# Patient Record
Sex: MALE | NOT HISPANIC OR LATINO | Employment: FULL TIME | URBAN - METROPOLITAN AREA
[De-identification: names, ages, dates, MRNs, and addresses within clinical notes are randomized per-mention and may not be internally consistent; named-entity substitution may affect disease eponyms.]

---

## 2017-06-27 ENCOUNTER — APPOINTMENT (OUTPATIENT)
Dept: RADIOLOGY | Facility: CLINIC | Age: 47
End: 2017-06-27
Payer: OTHER MISCELLANEOUS

## 2017-06-27 ENCOUNTER — TRANSCRIBE ORDERS (OUTPATIENT)
Dept: RADIOLOGY | Facility: CLINIC | Age: 47
End: 2017-06-27

## 2017-06-27 ENCOUNTER — APPOINTMENT (OUTPATIENT)
Dept: OCCUPATIONAL MEDICINE | Facility: CLINIC | Age: 47
End: 2017-06-27
Payer: OTHER MISCELLANEOUS

## 2017-06-27 DIAGNOSIS — S67.22XA: ICD-10-CM

## 2017-06-27 DIAGNOSIS — S67.22XA: Primary | ICD-10-CM

## 2017-06-27 PROCEDURE — 99203 OFFICE O/P NEW LOW 30 MIN: CPT

## 2017-06-27 PROCEDURE — 73130 X-RAY EXAM OF HAND: CPT

## 2017-06-30 ENCOUNTER — APPOINTMENT (OUTPATIENT)
Dept: OCCUPATIONAL MEDICINE | Facility: CLINIC | Age: 47
End: 2017-06-30
Payer: OTHER MISCELLANEOUS

## 2017-06-30 PROCEDURE — 99213 OFFICE O/P EST LOW 20 MIN: CPT

## 2017-07-19 ENCOUNTER — APPOINTMENT (OUTPATIENT)
Dept: OCCUPATIONAL MEDICINE | Facility: CLINIC | Age: 47
End: 2017-07-19
Payer: OTHER MISCELLANEOUS

## 2017-07-19 PROCEDURE — 99213 OFFICE O/P EST LOW 20 MIN: CPT

## 2017-07-31 ENCOUNTER — APPOINTMENT (OUTPATIENT)
Dept: URGENT CARE | Facility: CLINIC | Age: 47
End: 2017-07-31
Payer: OTHER MISCELLANEOUS

## 2017-07-31 PROCEDURE — 99214 OFFICE O/P EST MOD 30 MIN: CPT

## 2019-06-03 ENCOUNTER — TRANSCRIBE ORDERS (OUTPATIENT)
Dept: ADMINISTRATIVE | Facility: HOSPITAL | Age: 49
End: 2019-06-03

## 2019-06-03 DIAGNOSIS — M25.561 RIGHT KNEE PAIN, UNSPECIFIED CHRONICITY: Primary | ICD-10-CM

## 2025-07-24 ENCOUNTER — OFFICE VISIT (OUTPATIENT)
Age: 55
End: 2025-07-24
Payer: COMMERCIAL

## 2025-07-24 VITALS — BODY MASS INDEX: 38.92 KG/M2 | HEIGHT: 71 IN | WEIGHT: 278 LBS

## 2025-07-24 DIAGNOSIS — M77.11 LATERAL EPICONDYLITIS OF RIGHT ELBOW: Primary | ICD-10-CM

## 2025-07-24 PROCEDURE — 99213 OFFICE O/P EST LOW 20 MIN: CPT | Performed by: FAMILY MEDICINE

## 2025-07-24 PROCEDURE — 20605 DRAIN/INJ JOINT/BURSA W/O US: CPT | Performed by: FAMILY MEDICINE

## 2025-07-24 RX ORDER — TRIAMCINOLONE ACETONIDE 40 MG/ML
40 INJECTION, SUSPENSION INTRA-ARTICULAR; INTRAMUSCULAR
Status: COMPLETED | OUTPATIENT
Start: 2025-07-24 | End: 2025-07-24

## 2025-07-24 RX ORDER — LIDOCAINE HYDROCHLORIDE 10 MG/ML
1 INJECTION, SOLUTION INFILTRATION; PERINEURAL
Status: COMPLETED | OUTPATIENT
Start: 2025-07-24 | End: 2025-07-24

## 2025-07-24 RX ADMIN — TRIAMCINOLONE ACETONIDE 40 MG: 40 INJECTION, SUSPENSION INTRA-ARTICULAR; INTRAMUSCULAR at 10:00

## 2025-07-24 RX ADMIN — LIDOCAINE HYDROCHLORIDE 1 ML: 10 INJECTION, SOLUTION INFILTRATION; PERINEURAL at 10:00

## 2025-07-24 NOTE — PATIENT INSTRUCTIONS
"Patient Education     Elbow tendinopathy (tennis and golf elbow)   The Basics   Written by the doctors and editors at [facility]   What is elbow tendinopathy? -- Elbow tendinopathy is a condition that causes elbow pain and forearm weakness. The word \"tendinopathy\" refers to a problem with a tendon. Tendons are strong bands of tissue that connect muscles to bones.  Depending on which elbow tendon is injured, the condition is also known as \"tennis elbow\" or \"golf elbow.\" Doctors also used to call this condition \"tendinitis.\"  What causes elbow tendinopathy? -- This condition can happen as people get older, especially if they do a lot of work or activity using their elbow and forearm. It can also happen when people get hurt or do the same movements over and over.  The terms \"tennis elbow\" and \"golf elbow\" refer to the swinging motion people do in these sports. This can cause tendinopathy. But other activities or jobs can also cause this problem if they involve similar movements.  What are the symptoms of elbow tendinopathy? -- The most common symptoms are:   Elbow pain - This is the main symptom of elbow tendinopathy. Pain can start slowly or suddenly, and can be mild or more severe. It can spread to the upper arm or forearm. Pain is most common when the tendon is working or stretched.   Muscle weakness - The forearm muscles might feel weak when you  or squeeze something.   Swelling - Some people might have mild swelling in the elbow area.  Will I need tests? -- Maybe. Your doctor or nurse should be able to tell if you have elbow tendinopathy by talking with you and doing an exam. They might also have you do specific arm movements to better understand what motions or activities cause pain.  In some cases, the doctor might also do an imaging test, such as an ultrasound. Imaging tests create pictures of the inside of the body.  How is elbow tendinopathy treated? -- Most of the time, it gets better on its own, but it " "can take months to heal completely. To help get better, you can:   Rest your elbow and arm - If possible, try to avoid or reduce activities that make your pain worse.   Wear a brace or sleeve - Ask your doctor or nurse about this. Wearing a special brace or \"compression sleeve\" can help support your elbow and relieve pain. These work by reducing strain on the tendon when you use your arm.   Take a pain-relieving medicine - Your doctor might recommend that you take a medicine such as acetaminophen (sample brand name: Tylenol), ibuprofen (sample brand names: Advil, Motrin), or naproxen (sample brand names: Aleve, Naprosyn).   Put ice on your elbow - This might help after doing activities that make your pain worse. Put a cold gel pack, bag of ice, or bag of frozen vegetables on the area every 1 to 2 hours, for 15 minutes each time. Put a thin towel between the ice (or other cold object) and your skin.   Get physical therapy - This involves learning specific exercises to strengthen your muscles. This can help with your symptoms. The right exercises for you depend on which elbow tendon is injured. Your doctor or nurse can show you how to do these types of exercises. They will tell you when to start them and how often to do them. They might also refer you to a physical therapist (exercise expert).  Stretching the muscles in your lower arm can also be helpful. Depending on which tendon is injured, you can do stretches like:   Tennis elbow stretch (also called forearm extensor stretch) - Hold your injured arm straight out, and point your fingers down to the ground. Use your other hand (with the thumb pressing on the palm) to grab this hand. Then, press down on the back of the hand to bend the wrist more (picture 1). Hold this position for 30 seconds. Repeat the stretch 3 times. Do this exercise 1 time a day.   Golf elbow stretch - Stand an arm's length away from the wall, with the injured arm closest to the wall. Put your " palm on the wall, with your fingers pointing down. Press gently against the wall to stretch your muscles (picture 2). Hold this position for 30 seconds. Repeat the stretch 3 times. Do this exercise 1 time a day.  What if my symptoms don't get better? -- If you have been doing your exercises for at least 8 to 12 weeks and your symptoms are not getting better, talk with your doctor or nurse. They can suggest other treatments that might help. They might also want to do imaging tests, like an ultrasound or X-ray, to see if something else might be causing your symptoms.  Rarely, elbow tendinopathy is treated with surgery. This might be considered if other treatments do not help after many months. But the condition usually gets better without surgery.  Can elbow tendinopathy be prevented? -- Yes. To help prevent elbow tendinopathy, you can:   Take breaks when you do activities that involve moving your elbow and wrist a lot.   Keep your elbows slightly bent when you exercise or lift things.   Wear gloves or use 2 hands when using tools.   If you play tennis, use a 2-handed backhand swing.   If you play golf, use  tape or padding on your golf clubs.  All topics are updated as new evidence becomes available and our peer review process is complete.  This topic retrieved from [employer] on: Mar 16, 2024.  Topic 29414 Version 8.0  Release: 32.2.4 - C32.74  © 2024 UpToDate, Inc. and/or its affiliates. All rights reserved.  picture 1: Forearm extensor stretch     Hold your injured arm straight out, and point yourfingers down to the ground. Use your other hand (with the thumb pressing on thepalm) to grab this hand. Then, press down on the back of the hand to bend thewrist more.Hold this position for 30 seconds. Repeat the stretch 3 times. Do this exercise1 time a day.  Graphic 86933 Version 7.0  picture 2: Golf elbow stretch     Stand an arm's length away from the wall, with the injured arm closest to the wall. Put your palm on  the wall, with your fingers pointing down. Press gently against the wall to stretch your muscles. Hold this position for 30 seconds. Repeat the stretch 3 times. Do this exercise 1 time a day.  Graphic 14065 Version 1.0  Consumer Information Use and Disclaimer   Disclaimer: This generalized information is a limited summary of diagnosis, treatment, and/or medication information. It is not meant to be comprehensive and should be used as a tool to help the user understand and/or assess potential diagnostic and treatment options. It does NOT include all information about conditions, treatments, medications, side effects, or risks that may apply to a specific patient. It is not intended to be medical advice or a substitute for the medical advice, diagnosis, or treatment of a health care provider based on the health care provider's examination and assessment of a patient's specific and unique circumstances. Patients must speak with a health care provider for complete information about their health, medical questions, and treatment options, including any risks or benefits regarding use of medications. This information does not endorse any treatments or medications as safe, effective, or approved for treating a specific patient. UpToDate, Inc. and its affiliates disclaim any warranty or liability relating to this information or the use thereof.The use of this information is governed by the Terms of Use, available at https://www.wolterskluwer.com/en/know/clinical-effectiveness-terms. 2024© UpToDate, Inc. and its affiliates and/or licensors. All rights reserved.  Copyright   © 2024 UpToDate, Inc. and/or its affiliates. All rights reserved.

## 2025-07-24 NOTE — PROGRESS NOTES
"Orthopaedic Surgery - Office Note  Ino Santiago (55 y.o. male)   : 1970   MRN: [id number]  Encounter Date: 2025    Chief Complaint   Patient presents with    Right Elbow - Follow-up     Pt is following up for a clinical check, he had gotten an injection in his last visit, it didn't seem to make a difference and pt is feeling the same          Assessment & Plan  Lateral epicondylitis of right elbow         Pathophysiology was reviewed, management options were discussed.    We discussed management options, including repeat corticosteroid injection, as well as possible percutaneous tenotomy.  We elected to try a repeat corticosteroid injection, as he did get long-lasting relief from the last 1.  I also did give him a self-directed home physical therapy program which he will try to comply with.  Anticipate soreness tomorrow, then some improvement day-to-day over the next 2 weeks.  Return for any recurrence of symptoms.  All patient concerns were addressed today, all relevant questions were answered.        Return if symptoms worsen or fail to improve.        History of Present Illness  Presents a for a flareup of his right tennis elbow.  His last corticosteroid injection was in 2024 and he got great relief from it up until about 6 weeks ago.  He did not recall any recent trauma or injury.  He has been doing all of his activities as usual including martial arts and housework.  No subjective weakness or paralysis.  He admittedly does not do any preventative exercises for this tennis elbow.    Review of Systems  Pertinent items are noted in HPI.  All other systems were reviewed and are negative.    Physical Exam  Ht 5' 11\" (1.803 m)   Wt 126 kg (278 lb)   BMI 38.77 kg/m²   Cons: Appears well.  No apparent distress.  Psych: Alert. Oriented x3.  Mood and affect normal.      Focused physical exam of the right elbow demonstrates exquisite tenderness at lateral epicondyle, this is worse with " "provocation of, sensor.  He has excellent motion and motor.  There is no instability with normal varus and valgus.  There is some mild swelling at lateral epicondyle.            Studies Reviewed      Medium joint arthrocentesis: R elbow    Performed by: Sheila Coates MD  Authorized by: Sheila Coates MD    Universal Protocol:  Consent: Verbal consent obtained  Risks and benefits: risks, benefits and alternatives were discussed  Consent given by: patient  Time out: Immediately prior to procedure a \"time out\" was called to verify the correct patient, procedure, equipment, support staff and site/side marked as required.  Timeout called at: 7/24/2025 10:21 AM.  Patient understanding: patient states understanding of the procedure being performed  Patient consent: the patient's understanding of the procedure matches consent given  Procedure consent: procedure consent matches procedure scheduled  Patient identity confirmed: verbally with patient  Procedure Details  Location: elbow - R elbow  Preparation: Patient was prepped and draped in the usual sterile fashion  Needle size: 22 G  Ultrasound guidance: no  Approach: anterolateral  Medications administered: 1 mL lidocaine 1 %; 40 mg triamcinolone acetonide 40 mg/mL    Patient tolerance: patient tolerated the procedure well with no immediate complications             Medical, Surgical, Family, and Social History  The patient's medical history, family history, and social history, were reviewed and updated as appropriate.    Past Medical History[1]    Past Surgical History[2]    Family History[3]    Social History     Occupational History    Not on file   Tobacco Use    Smoking status: Never    Smokeless tobacco: Not on file   Substance and Sexual Activity    Alcohol use: Yes     Comment: FEW TIMES A WEEK    Drug use: Never    Sexual activity: Not on file       Allergies[4]    Current Medications[5]      Yahir Coates MD       [1] No past medical history " on file.  [2]   Past Surgical History:  Procedure Laterality Date    SHOULDER SURGERY N/A    [3]   Family History  Problem Relation Name Age of Onset    Cirrhosis Father      Liver disease Father     [4] No Known Allergies  [5] No current outpatient medications on file.